# Patient Record
Sex: MALE | Race: WHITE
[De-identification: names, ages, dates, MRNs, and addresses within clinical notes are randomized per-mention and may not be internally consistent; named-entity substitution may affect disease eponyms.]

---

## 2019-06-13 ENCOUNTER — HOSPITAL ENCOUNTER (OUTPATIENT)
Dept: HOSPITAL 76 - LAB.F | Age: 44
Discharge: HOME | End: 2019-06-13
Attending: INTERNAL MEDICINE
Payer: MEDICAID

## 2019-06-13 DIAGNOSIS — Z00.00: Primary | ICD-10-CM

## 2019-06-13 LAB
ALBUMIN DIAFP-MCNC: 4.9 G/DL (ref 3.2–5.5)
ALBUMIN/GLOB SERPL: 1.5 {RATIO} (ref 1–2.2)
ALP SERPL-CCNC: 65 IU/L (ref 42–121)
ALT SERPL W P-5'-P-CCNC: 22 IU/L (ref 10–60)
ANION GAP SERPL CALCULATED.4IONS-SCNC: 11 MMOL/L (ref 6–13)
AST SERPL W P-5'-P-CCNC: 20 IU/L (ref 10–42)
BILIRUB BLD-MCNC: 1.2 MG/DL (ref 0.2–1)
BUN SERPL-MCNC: 20 MG/DL (ref 6–20)
CALCIUM UR-MCNC: 9.6 MG/DL (ref 8.5–10.3)
CHLORIDE SERPL-SCNC: 101 MMOL/L (ref 101–111)
CHOLEST SERPL-MCNC: 259 MG/DL
CO2 SERPL-SCNC: 24 MMOL/L (ref 21–32)
CREAT SERPLBLD-SCNC: 0.9 MG/DL (ref 0.6–1.2)
GFRSERPLBLD MDRD-ARVRAT: 91 ML/MIN/{1.73_M2} (ref 89–?)
GLOBULIN SER-MCNC: 3.3 G/DL (ref 2.1–4.2)
GLUCOSE SERPL-MCNC: 99 MG/DL (ref 70–100)
HDLC SERPL-MCNC: 70 MG/DL
HDLC SERPL: 3.7 {RATIO} (ref ?–5)
PROT SPEC-MCNC: 8.2 G/DL (ref 6.7–8.2)
SODIUM SERPLBLD-SCNC: 136 MMOL/L (ref 135–145)

## 2019-06-13 PROCEDURE — 80053 COMPREHEN METABOLIC PANEL: CPT

## 2019-06-13 PROCEDURE — 36415 COLL VENOUS BLD VENIPUNCTURE: CPT

## 2019-06-13 PROCEDURE — 83721 ASSAY OF BLOOD LIPOPROTEIN: CPT

## 2019-06-13 PROCEDURE — 80061 LIPID PANEL: CPT

## 2019-08-12 ENCOUNTER — HOSPITAL ENCOUNTER (OUTPATIENT)
Dept: HOSPITAL 76 - EMS | Age: 44
Discharge: ANOTHER HOSPITAL | End: 2019-08-12
Attending: SURGERY
Payer: MEDICAID

## 2019-08-12 DIAGNOSIS — R20.2: ICD-10-CM

## 2019-08-12 DIAGNOSIS — R53.81: Primary | ICD-10-CM

## 2019-08-12 DIAGNOSIS — R11.10: ICD-10-CM

## 2019-08-12 DIAGNOSIS — R45.1: ICD-10-CM

## 2020-03-03 ENCOUNTER — HOSPITAL ENCOUNTER (OUTPATIENT)
Dept: HOSPITAL 76 - DI | Age: 45
Discharge: HOME | End: 2020-03-03
Attending: NAPRAPATH
Payer: SELF-PAY

## 2020-03-03 DIAGNOSIS — S67.42XA: Primary | ICD-10-CM

## 2020-03-03 NOTE — XRAY REPORT
Reason:  persisting sxs of pain and swelling, difficulty closing fist

Procedure Date:  03/03/2020   

Accession Number:  [id number] / [id number]                    

Procedure:  XR  - Hand 3 View LT CPT Code:  

 

***Final Report***

 

 

FULL RESULT:

 

 

EXAM:

LEFT HAND RADIOGRAPHY

 

EXAM DATE: 3/3/2020 10:00 PM.

 

CLINICAL HISTORY: Persisting left hand pain and swelling, difficulty 

closing fist. History of fall.

 

COMPARISON: None.

 

TECHNIQUE: 3 views.

 

FINDINGS:

Bones: Normal. No fractures or bone lesions.

 

Joints: Normal. No subluxations.

 

Soft Tissues: Normal. No soft tissue swelling.

IMPRESSION: Normal hand radiography.

 

[facility]